# Patient Record
Sex: MALE | Race: WHITE | NOT HISPANIC OR LATINO | URBAN - METROPOLITAN AREA
[De-identification: names, ages, dates, MRNs, and addresses within clinical notes are randomized per-mention and may not be internally consistent; named-entity substitution may affect disease eponyms.]

---
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
## 2024-07-21 ENCOUNTER — TRANSCRIPTION ENCOUNTER (OUTPATIENT)
Age: 19
End: 2024-07-21

## 2024-07-22 ENCOUNTER — RESULT REVIEW (OUTPATIENT)
Age: 19
End: 2024-07-22

## 2024-07-22 ENCOUNTER — TRANSCRIPTION ENCOUNTER (OUTPATIENT)
Age: 19
End: 2024-07-22

## 2024-07-30 PROBLEM — Z00.00 ENCOUNTER FOR PREVENTIVE HEALTH EXAMINATION: Status: ACTIVE | Noted: 2024-07-30

## 2024-08-02 ENCOUNTER — APPOINTMENT (OUTPATIENT)
Dept: SURGERY | Facility: CLINIC | Age: 19
End: 2024-08-02
Payer: COMMERCIAL

## 2024-08-02 VITALS
WEIGHT: 165 LBS | DIASTOLIC BLOOD PRESSURE: 76 MMHG | BODY MASS INDEX: 21.87 KG/M2 | TEMPERATURE: 98 F | HEART RATE: 94 BPM | HEIGHT: 73 IN | OXYGEN SATURATION: 98 % | SYSTOLIC BLOOD PRESSURE: 111 MMHG

## 2024-08-02 DIAGNOSIS — Z78.9 OTHER SPECIFIED HEALTH STATUS: ICD-10-CM

## 2024-08-02 DIAGNOSIS — K37 UNSPECIFIED APPENDICITIS: ICD-10-CM

## 2024-08-02 DIAGNOSIS — Z83.438 FAMILY HISTORY OF OTHER DISORDER OF LIPOPROTEIN METABOLISM AND OTHER LIPIDEMIA: ICD-10-CM

## 2024-08-02 PROCEDURE — 99024 POSTOP FOLLOW-UP VISIT: CPT

## 2024-08-08 NOTE — DATA REVIEWED
[FreeTextEntry1] : Surgical Pathology Report - Auth (Verified)  Specimen(s) Submitted 1  Appendix  Final Diagnosis Appendix; appendectomy: -   Acute appendicitis.

## 2024-08-08 NOTE — PHYSICAL EXAM
[Oriented to Person] : oriented to person [Oriented to Place] : oriented to place [Oriented to Time] : oriented to time [Calm] : calm [Abdominal Masses] : No abdominal masses [Abdomen Tenderness] : ~T ~M No abdominal tenderness [Tender] : was nontender [de-identified] : NAD, comfortable [de-identified] : Normocephalic, atraumatic. No scleral icterus.  [de-identified] : Supple, no JVD or cervical lymphadenopathy.  [de-identified] : No respiratory distress  [de-identified] : +BS soft, nontender, nondistended. Incisions healing well, Dermabond in place. No surrounding erythema or induration.

## 2024-08-08 NOTE — PHYSICAL EXAM
[Oriented to Person] : oriented to person [Oriented to Place] : oriented to place [Oriented to Time] : oriented to time [Calm] : calm [Abdominal Masses] : No abdominal masses [Abdomen Tenderness] : ~T ~M No abdominal tenderness [Tender] : was nontender [de-identified] : NAD, comfortable [de-identified] : Normocephalic, atraumatic. No scleral icterus.  [de-identified] : No respiratory distress  [de-identified] : Supple, no JVD or cervical lymphadenopathy.  [de-identified] : +BS soft, nontender, nondistended. Incisions healing well, Dermabond in place. No surrounding erythema or induration.

## 2024-08-08 NOTE — HISTORY OF PRESENT ILLNESS
[de-identified] : This is a 20 y/o male with no PMH recently presented to [facility] with RLQ pain, CT demonstrated Acute Appendicitis and underwent a Laparoscopic Appendectomy on 7/22/24 and discharge the same day.  Patient returns today for a routine post operative visit. He states he is overall feeling well. Tolerating diet, eating and drinking as usual. Regular bowel movements and voiding as usual. Denies any significant discomfort or pain. Denies fever, chest pain, shortness of breath, nausea, vomiting or constipation.

## 2024-08-08 NOTE — HISTORY OF PRESENT ILLNESS
[de-identified] : This is a 18 y/o male with no PMH recently presented to [facility] with RLQ pain, CT demonstrated Acute Appendicitis and underwent a Laparoscopic Appendectomy on 7/22/24 and discharge the same day.  Patient returns today for a routine post operative visit. He states he is overall feeling well. Tolerating diet, eating and drinking as usual. Regular bowel movements and voiding as usual. Denies any significant discomfort or pain. Denies fever, chest pain, shortness of breath, nausea, vomiting or constipation.

## 2024-08-08 NOTE — HISTORY OF PRESENT ILLNESS
[de-identified] : This is a 18 y/o male with no PMH recently presented to [facility] with RLQ pain, CT demonstrated Acute Appendicitis and underwent a Laparoscopic Appendectomy on 7/22/24 and discharge the same day.  Patient returns today for a routine post operative visit. He states he is overall feeling well. Tolerating diet, eating and drinking as usual. Regular bowel movements and voiding as usual. Denies any significant discomfort or pain. Denies fever, chest pain, shortness of breath, nausea, vomiting or constipation.

## 2024-08-08 NOTE — ASSESSMENT
[FreeTextEntry1] : 18 y/o male s/p lap appendectomy on 7/22/24. Patient seems to be doing well post operatively and recovering as expected. Discussed gradual return to normal activity and natural scar maturation. Pathology discussed/reviewed with patient. All questions answered. F/u as needed.   Plan: RTC prn

## 2024-08-08 NOTE — PHYSICAL EXAM
[Oriented to Person] : oriented to person [Oriented to Place] : oriented to place [Oriented to Time] : oriented to time [Calm] : calm [Abdominal Masses] : No abdominal masses [Abdomen Tenderness] : ~T ~M No abdominal tenderness [Tender] : was nontender [de-identified] : NAD, comfortable [de-identified] : Normocephalic, atraumatic. No scleral icterus.  [de-identified] : Supple, no JVD or cervical lymphadenopathy.  [de-identified] : No respiratory distress  [de-identified] : +BS soft, nontender, nondistended. Incisions healing well, Dermabond in place. No surrounding erythema or induration.